# Patient Record
Sex: FEMALE | URBAN - METROPOLITAN AREA
[De-identification: names, ages, dates, MRNs, and addresses within clinical notes are randomized per-mention and may not be internally consistent; named-entity substitution may affect disease eponyms.]

---

## 2021-02-19 ENCOUNTER — APPOINTMENT (OUTPATIENT)
Dept: URBAN - METROPOLITAN AREA CLINIC 282 | Age: 56
Setting detail: DERMATOLOGY
End: 2021-02-22

## 2021-02-19 VITALS — TEMPERATURE: 98.2 F

## 2021-02-19 DIAGNOSIS — Z71.89 OTHER SPECIFIED COUNSELING: ICD-10-CM

## 2021-02-19 DIAGNOSIS — D22 MELANOCYTIC NEVI: ICD-10-CM

## 2021-02-19 DIAGNOSIS — L57.8 OTHER SKIN CHANGES DUE TO CHRONIC EXPOSURE TO NONIONIZING RADIATION: ICD-10-CM

## 2021-02-19 DIAGNOSIS — L81.1 CHLOASMA: ICD-10-CM

## 2021-02-19 DIAGNOSIS — L64.8 OTHER ANDROGENIC ALOPECIA: ICD-10-CM

## 2021-02-19 PROBLEM — D22.39 MELANOCYTIC NEVI OF OTHER PARTS OF FACE: Status: ACTIVE | Noted: 2021-02-19

## 2021-02-19 PROBLEM — D22.72 MELANOCYTIC NEVI OF LEFT LOWER LIMB, INCLUDING HIP: Status: ACTIVE | Noted: 2021-02-19

## 2021-02-19 PROBLEM — L65.9 NONSCARRING HAIR LOSS, UNSPECIFIED: Status: ACTIVE | Noted: 2021-02-19

## 2021-02-19 PROCEDURE — OTHER COUNSELING: OTHER

## 2021-02-19 PROCEDURE — OTHER PRESCRIPTION: OTHER

## 2021-02-19 PROCEDURE — OTHER SUNSCREEN RECOMMENDATIONS: OTHER

## 2021-02-19 PROCEDURE — OTHER OTHER: OTHER

## 2021-02-19 PROCEDURE — 99204 OFFICE O/P NEW MOD 45 MIN: CPT

## 2021-02-19 PROCEDURE — OTHER IN-HOUSE DISPENSING PHARMACY: OTHER

## 2021-02-19 PROCEDURE — OTHER MIPS QUALITY: OTHER

## 2021-02-19 RX ORDER — MINOXIDIL 2 %
SOLUTION, NON-ORAL TOPICAL
Qty: 1 | Refills: 1 | Status: ERX | COMMUNITY
Start: 2021-02-19

## 2021-02-19 ASSESSMENT — LOCATION DETAILED DESCRIPTION DERM
LOCATION DETAILED: LEFT ANTERIOR PROXIMAL THIGH
LOCATION DETAILED: RIGHT INFERIOR CENTRAL MALAR CHEEK
LOCATION DETAILED: LEFT INFERIOR CENTRAL MALAR CHEEK
LOCATION DETAILED: INFERIOR MID FOREHEAD
LOCATION DETAILED: LEFT SUPERIOR MEDIAL FOREHEAD
LOCATION DETAILED: LEFT INFERIOR MEDIAL FOREHEAD
LOCATION DETAILED: NASAL INFRATIP
LOCATION DETAILED: RIGHT CENTRAL MALAR CHEEK
LOCATION DETAILED: RIGHT CHIN
LOCATION DETAILED: LEFT CHIN

## 2021-02-19 ASSESSMENT — LOCATION SIMPLE DESCRIPTION DERM
LOCATION SIMPLE: LEFT FOREHEAD
LOCATION SIMPLE: RIGHT CHEEK
LOCATION SIMPLE: CHIN
LOCATION SIMPLE: INFERIOR FOREHEAD
LOCATION SIMPLE: LEFT THIGH
LOCATION SIMPLE: NOSE
LOCATION SIMPLE: LEFT CHEEK

## 2021-02-19 ASSESSMENT — LOCATION ZONE DERM
LOCATION ZONE: FACE
LOCATION ZONE: LEG
LOCATION ZONE: NOSE

## 2021-02-19 NOTE — PROCEDURE: IN-HOUSE DISPENSING PHARMACY
Product 25 Units Dispensed: 0
Product 71 Unit Type: mg
Detail Level: Zone
Product 1 Application Directions: Apply a pea size amount to face nightly after cleansing
Product 1 Price/Unit (In Dollars): 60
Product 1 Units Dispensed: 1
Render Product Pricing In Note: Yes
Product 1 Amount/Unit (Numbers Only): 30
Name Of Product 1: Melasma Emulsion 4%

## 2021-02-19 NOTE — PROCEDURE: OTHER
Other (Free Text): Discussed work up of labs and possible biopsy at future visit. No changes in diet. No inflammatory symptoms/signs today.
Detail Level: Zone
Render Risk Assessment In Note?: no
Note Text (......Xxx Chief Complaint.): This diagnosis correlates with the